# Patient Record
(demographics unavailable — no encounter records)

---

## 2024-10-18 NOTE — HISTORY OF PRESENT ILLNESS
[de-identified] : cough [FreeTextEntry6] : - p/w a week of cough, followed by 2 days or daytime accidents (at ), and a few episodes of vomiting today - denies fever, diarrhea, ski rash - reported that mother has had cold and developed sinusitis, and her daughter also tends to develop sinusitis after cold

## 2024-10-18 NOTE — PHYSICAL EXAM
[Clear Rhinorrhea] : clear rhinorrhea [Mucoid Discharge] : mucoid discharge [Normal External Genitalia] : normal external genitalia [Capillary Refill <2s] : capillary refill < 2s [NL] : warm, clear [Labial Adhesions] : no labial adhesions [Erythematous Labia Minora] : nonerythematous labia minora [Erythematous Labia Majora] : nonerythematous labia majora [Vaginal Discharge] : no vaginal discharge [de-identified] : PND++

## 2024-10-18 NOTE — DISCUSSION/SUMMARY
[FreeTextEntry1] : # sinusitis - supportive care: humidigier, saline drops, suctioning, honey - placed on 5 days of AZM  - RVP and throat culture will be sent out; will follow results, informed AZM cover streptococcal infection - RTC if no improvement

## 2024-10-23 NOTE — PHYSICAL EXAM
[Normal External Genitalia] : normal external genitalia [Erythematous Labia Minora] : erythematous labia minora [Capillary Refill <2s] : capillary refill < 2s [NL] : warm, clear

## 2024-10-23 NOTE — HISTORY OF PRESENT ILLNESS
[de-identified] : frequent urination [FreeTextEntry6] : - completing 5 days of AZM, reported her respiratory symptoms improving - having 2 accidents at school yesterday and today brought her in for concern of UTI - maintains good appetite eating her second bar today - denies fever

## 2024-10-23 NOTE — DISCUSSION/SUMMARY
[FreeTextEntry1] : # frequent urination - UA and UC to rule out UTI, as requested; informed caregiver(s) tests likely negative as she has been on antibiotic treatement - genital hygiene advised - will follow results

## 2024-11-20 NOTE — HISTORY OF PRESENT ILLNESS
[de-identified] : cough [FreeTextEntry6] : - p/w a week of cough, a/w sneezing runny cough, and sleepless at night - she is active, running down, eating fine - denies fever, emesis, diarrhea, skin rash - 5 kids out of school, pneumonia going around - Flu vaccine desired at this visit, if she is well enough

## 2024-11-20 NOTE — DISCUSSION/SUMMARY
[FreeTextEntry1] : # URI - supportive care advised: humidifier, hydration, honey if 1 year and older, bulb suctioning, positioning. etc.  - RTC for persistent high fever (longer than 3 days), breathing difficulty, poor oral intake, decreased UOP, or any new concerns/symptoms - will follow RVP results and throat culture # Flu vaccine administered, VIS given [] : The components of the vaccine(s) to be administered today are listed in the plan of care. The disease(s) for which the vaccine(s) are intended to prevent and the risks have been discussed with the caretaker.  The risks are also included in the appropriate vaccination information statements which have been provided to the patient's caregiver.  The caregiver has given consent to vaccinate.

## 2024-11-20 NOTE — PHYSICAL EXAM
[Clear Effusion] : clear effusion [Clear Rhinorrhea] : clear rhinorrhea [Capillary Refill <2s] : capillary refill < 2s [NL] : warm, clear [Erythema] : no erythema

## 2025-01-17 NOTE — HISTORY OF PRESENT ILLNESS
[FreeTextEntry1] : mom called to follow up regarding her concern regarding Fabio's behavior issues and has been getting feed backs from school that she's "dysregulated" a lot and as if switch turns on and never stops, needs to have someone to sit with her (currently has SEIT in her school who sits with her) in order to finish tasks or eating must have the screen on to keep her company (home/school). very social and chatty toddler in general, she went to a [employer] party over the weekend and was running wild causing the other kids to go wild also at the event, jumping, banging on the table and it was about 15 kids in a pretty tight space, mom had to take her out eventually b/c all the commotion she was making.  Then later Fabio says sorry about what she did at the party and feeling bad about it. Tends to go crazy in places like the airport describes mom, she'd run and go to everywhere vs walking with parents, mom gets very stressed out with all of these. Also mentions Fabio does not like loud noises, if fire truck olga goes by she covers her ears.   no concerns for speech/social and emotional skills, mom feels she's very smart, understands she's doing sometime wrong but still does it anyway and worried this will affect her going forward with school

## 2025-01-17 NOTE — PLAN
[TextEntry] : I discussed with mom some of the behaviors she's hearing/observing is definitely appropriate behavior and needs time/maturity to improve them however, being dysregulated easily can indeed cause issues with school/home and it would be best to reach out for play/group therapy as she's only 3yr old and there's ongoing development and changes with her psychologically and emotionally and it would require a licensed professional (i.e developmental peds, peds psych) to formally diagnose her with something such as ADHD. I think family would benefit from getting social/group therapy together learning how to help her when she's dysregulated to reset and being able to stay focused for longer for appropriate settings.  referred to [facility] again.

## 2025-04-16 NOTE — DISCUSSION/SUMMARY
[FreeTextEntry1] : # URI - RVP will be sent out (day of illness 7); r/o mycoplasma - supportive care: saline neb, honey, zyrtec, clearing nose, soft diet, hydration, etc. - RTC if no improvement

## 2025-04-16 NOTE — REVIEW OF SYSTEMS
[Difficulty with Sleep] : difficulty with sleep [Nasal Congestion] : nasal congestion [Cough] : cough [Negative] : Genitourinary [Appetite Changes] : appetite changes

## 2025-05-22 NOTE — DEVELOPMENTAL MILESTONES
[Normal Development] : Normal Development [Uses 4-word sentences] : uses 4-word sentences [Uses words that are 100%] : uses words that are 100% intelligible to strangers [Tells a story from a book] : tells a story from a book [Yes: _______] : yes, [unfilled] [Goes to the bathroom and has] : goes to bathroom and has bowel movement by self [Dresses and undresses without] : dresses and undresses without much help [Plays make-believe] : plays make-believe [Climbs stairs, alternating feet] : climbs stairs, alternating feet without support [Skips on one foot] : skips on one foot [Draws a person with head and] : draws a person with head and 3 body part [Draws a simple cross] : draws a simple cross [Unbuttons medium-sized buttons] : unbuttons medium sized buttons [Grasps a pencil with thumb and] : grasps a pencil with thumb and fingers instead of fist [Draws recognizable pictures] : draws recognizable pictures [FreeTextEntry1] : boris trained night/day already

## 2025-05-22 NOTE — DISCUSSION/SUMMARY
[Normal Growth] : growth [Normal Development] : development  [No Elimination Concerns] : elimination [Continue Regimen] : feeding [No Skin Concerns] : skin [Normal Sleep Pattern] : sleep [None] : no medical problems [School Readiness] : school readiness [Healthy Personal Habits] : healthy personal habits [TV/Media] : tv/media [Child and Family Involvement] : child and family involvement [Safety] : safety [Anticipatory Guidance Given] : Anticipatory guidance addressed as per the history of present illness section [No Medications] : ~He/She~ is not on any medications [] : The components of the vaccine(s) to be administered today are listed in the plan of care. The disease(s) for which the vaccine(s) are intended to prevent and the risks have been discussed with the caretaker.  The risks are also included in the appropriate vaccination information statements which have been provided to the patient's caregiver.  The caregiver has given consent to vaccinate. [FreeTextEntry6] : MMRVZV and kinrix updated  [FreeTextEntry1] : Saranya is a healthy 4yr old preschooler, been well, no major health issues/concerns s/p neuropsych evaluation per [facility] and summary as follow: ADHD combined type, moderate, General Anxiety d/o, and sensory overresponsivity d/o. has SEIT in school now and hoping to find someone for summer time/camp season, mom has yet to start implementing the recommendations at home from evaluation since very recently done, prefers to stay in public school and see if can get needed support and doing well, medication is an option but not jumping into it just yet until exhausted all the resources update MMRV/kinrix today post vaccination care discussed

## 2025-05-22 NOTE — PHYSICAL EXAM

## 2025-05-22 NOTE — HISTORY OF PRESENT ILLNESS
[Mother] : mother [Fruit] : fruit [Vegetables] : vegetables [Meat] : meat [Grains] : grains [Firm] : stools are firm consistency [Normal] : Normal [Sippy cup use] : Sippy cup use [Brushing teeth] : Brushing teeth [Yes] : Patient goes to dentist yearly [Toothpaste] : Primary Fluoride Source: Toothpaste [Curiosity about body] : Curiosity about body [Appropiate parent-child communication] : Appropriate parent-child communication [Child given choices] : Child given choices [Child Cooperates] : Child cooperates [Parent has appropriate responses to behavior] : Parent has appropriate responses to behavior [No] : Not at  exposure [Water heater temperature set at <120 degrees F] : Water heater temperature set at <120 degrees F [Car seat in back seat] : Car seat in back seat [Carbon Monoxide Detectors] : Carbon monoxide detectors [Supervised outdoor play] : Supervised outdoor play [Up to date] : Up to date [Exposure to electronic nicotine delivery system] : No exposure to electronic nicotine delivery system [FreeTextEntry7] : 4yr old  [de-identified] : attention/focus, fidgeting all the time, got neuropsych evaluation done, had summary sent in today for review  [FreeTextEntry9] : 3K now